# Patient Record
Sex: MALE | Race: WHITE
[De-identification: names, ages, dates, MRNs, and addresses within clinical notes are randomized per-mention and may not be internally consistent; named-entity substitution may affect disease eponyms.]

---

## 2018-06-30 ENCOUNTER — HOSPITAL ENCOUNTER (OUTPATIENT)
Dept: HOSPITAL 80 - FIMAGING | Age: 68
End: 2018-06-30
Attending: INTERNAL MEDICINE
Payer: COMMERCIAL

## 2018-06-30 DIAGNOSIS — M48.061: ICD-10-CM

## 2018-06-30 DIAGNOSIS — M47.896: ICD-10-CM

## 2018-06-30 DIAGNOSIS — M79.604: Primary | ICD-10-CM

## 2018-06-30 DIAGNOSIS — C90.00: ICD-10-CM

## 2018-06-30 PROCEDURE — A9585 GADOBUTROL INJECTION: HCPCS

## 2018-06-30 PROCEDURE — 72158 MRI LUMBAR SPINE W/O & W/DYE: CPT

## 2019-04-19 ENCOUNTER — HOSPITAL ENCOUNTER (EMERGENCY)
Dept: HOSPITAL 80 - FED | Age: 69
Discharge: HOME | End: 2019-04-19
Payer: COMMERCIAL

## 2019-04-19 VITALS — DIASTOLIC BLOOD PRESSURE: 83 MMHG | SYSTOLIC BLOOD PRESSURE: 143 MMHG

## 2019-04-19 DIAGNOSIS — N23: Primary | ICD-10-CM

## 2019-04-19 DIAGNOSIS — Z87.442: ICD-10-CM

## 2019-04-19 DIAGNOSIS — C90.00: ICD-10-CM

## 2019-04-19 DIAGNOSIS — F17.200: ICD-10-CM

## 2019-04-19 LAB — PLATELET # BLD: 121 10^3/UL (ref 150–400)

## 2019-04-19 PROCEDURE — 96375 TX/PRO/DX INJ NEW DRUG ADDON: CPT

## 2019-04-19 PROCEDURE — 96374 THER/PROPH/DIAG INJ IV PUSH: CPT

## 2019-04-19 PROCEDURE — 99285 EMERGENCY DEPT VISIT HI MDM: CPT

## 2019-04-19 PROCEDURE — 74176 CT ABD & PELVIS W/O CONTRAST: CPT

## 2019-04-19 NOTE — EDPHY
H & P


Stated Complaint: right sided abdominal pain n/v starting last night


Time Seen by Provider: 04/19/19 12:41


HPI/ROS: 





CHIEF COMPLAINT:  Right flank pain





HISTORY OF PRESENT ILLNESS:  68-year-old male with multiple myeloma and prior 

kidney stones presents with right flank pain.  Sudden onset of right flank pain 

yesterday evening.  The pain is moderate to severe and waxes and wanes.  

Associated with multiple episodes of vomiting.  Pain is currently moderate.  No 

urinary symptoms and no fever.





REVIEW OF SYSTEMS:  complete 10 point ROS reviewed and is negative except for 

the noted elements in the HPI








- Medical/Surgical History


Hx Asthma: No


Hx Chronic Respiratory Disease: No


Hx Diabetes: No


Hx Cardiac Disease: No


Hx Renal Disease: No


Hx Cirrhosis: No


Hx Alcoholism: No


Hx HIV/AIDS: No


Hx Splenectomy or Spleen Trauma: No


Other PMH: multiple myloma, gout





- Social History


Smoking Status: Current every day smoker


Alcohol Use: Sober


Drug Use: None


Additional Social History: 











- Physical Exam


Exam: 





General Appearance:  Alert, pleasant


Eyes:  Pupils equal and round, no conjunctival pallor


ENT, Mouth:  Mucous membranes moist


Neck:  Normal inspection


Respiratory:  Lungs are clear to auscultation


Cardiovascular:  Regular rate and rhythm


Gastrointestinal:  Abdomen is soft, right upper quadrant and right lower 

quadrant tenderness, no peritoneal signs


Back:  No CVA tenderness


Neurological:  A&O, nonfocal, normal gait


Skin:  Warm and dry, no rash


Extremities:  Nontender, no pedal edema


Psychiatric:  Mood and affect normal





Constitutional: 


 Initial Vital Signs











Temperature (C)  36.5 C   04/19/19 11:50


 


Heart Rate  66   04/19/19 11:50


 


Respiratory Rate  18   04/19/19 11:50


 


Blood Pressure  143/87 H  04/19/19 11:50


 


O2 Sat (%)  99   04/19/19 11:50








 











O2 Delivery Mode               Room Air














Allergies/Adverse Reactions: 


 





adhesive tape Allergy (Verified 04/19/19 11:48)


 








Home Medications: 














 Medication  Instructions  Recorded


 


Acyclovir  04/19/19


 


Allopurinol  04/19/19


 


Amitriptyline HCl  04/19/19


 


Aspirin  04/19/19


 


DEXAMETHASONE  04/19/19


 


Darzalex  04/19/19


 


Hydrocodone/APAP 5/325 [Norco 1 - 2 tab PO Q4H PRN #20 tab 04/19/19





5/325 (*)]  


 


Montelukast Sodium  04/19/19


 


Ondansetron Odt [Zofran Odt 4 mg 4 mg PO Q4 PRN #6 tab 04/19/19





(*)]  


 


Prochlorperazine Maleate  04/19/19


 


Revlimid  04/19/19


 


Vicodin 5-300 mg Tablet  04/19/19














Medical Decision Making





- Diagnostics


Imaging Results: 





Abdomen/Pelvis CT  04/19/19 13:35


IMPRESSION: 


1. 7 mm obstructing right midureteral stone causing moderate 

hydroureteronephrosis.


2. Numerous bladder calculi.


3. Diffuse bladder wall thickening. With an enlarged prostate this may be 

secondary to bladder outlet obstruction. However, there is more focal, 

asymmetric thickening anteriorly on the left measuring up to 1.3 cm concerning 

for bladder wall mass. Cystoscopic correlation is recommended.


4. Destructive lytic lesion in the L4 vertebral body compatible with metastatic 

multiple myeloma given patient's history. Posterior cortex is eroded causing 

risk of pathologic fracture. MRI lumbar spine may be useful for further 

characterization.


 


Martha Mares was notified of these findings by telephone at 2:20 PM on 4/19/2019





Imaging: Discussed imaging studies w/ On call Radiologist


ED Course/Re-evaluation: 





This patient presents with right flank pain, most suggestive of renal colic.  

IV morphine and Zofran given for nausea and pain relief.  CT scan of the 

abdomen pelvis obtained.





CT scan reveals a 7 mm right ureteral calculus and moderate hydronephrosis.  

Results discussed with the patient.  He is currently comfortable.  Admission 

versus discharge home discussed with the patient, given the large size of the 

stone.  He declines admission and wishes to go home.  He will follow up with 

Urology in the office.  A prescription for hydrocodone written.  Urinalysis 

pending on discharge home.





4/20/19: UA reveals 10-15 WBC.  Urine cx sent. ED RN to call pt to inform and 

to place pt on Cipro 500mg bid, pending culture results.


 


Differential Diagnosis: 





Differential diagnosis includes though it is not limited to appendicitis, 

cholecystitis, diverticulitis, pyelonephritis, bowel perforation, small bowel 

obstruction.





- Data Points


Laboratory Results: 


 Laboratory Results





 04/19/19 12:25 





 04/19/19 12:25 








Medications Given: 


 








Discontinued Medications





Morphine Sulfate (Morphine)  4 mg IVP EDNOW ONE


   Stop: 04/19/19 13:39


   Last Admin: 04/19/19 14:01 Dose:  4 mg


Ondansetron HCl (Zofran)  4 mg IVP EDNOW ONE


   Stop: 04/19/19 14:03


   Last Admin: 04/19/19 14:03 Dose:  4 mg








Departure





- Departure


Disposition: Home, Routine, Self-Care


Clinical Impression: 


 Renal colic on right side





Condition: Good


Instructions:  Hydrocodone/Acetaminophen (By mouth), Ureteral Stones (ED)


Additional Instructions: 


Return for worsening symptoms, uncontrolled pain or any concerns.


Ibuprofen 600 mg 3 times daily while the pain persists.


Your CT scan shows a 7mm stone in the right ureter.  You also have a lesion of 

the 4th lumbar vertebrae.  Followup with Dr. Marcum regarding this finding.








Referrals: 


Moritz,Carl A, MD [Primary Care Provider] - As per Instructions


Byron Avila MD [Medical Doctor] - As per Instructions (Call to make an 

appointment.)


Prescriptions: 


Hydrocodone/APAP 5/325 [Norco 5/325 (*)] 1 - 2 tab PO Q4H PRN #20 tab


 PRN Reason: Pain, Moderate


Ondansetron Odt [Zofran Odt 4 mg (*)] 4 mg PO Q4 PRN #6 tab


 PRN Reason: Nausea